# Patient Record
Sex: FEMALE | Race: WHITE | Employment: OTHER | URBAN - METROPOLITAN AREA
[De-identification: names, ages, dates, MRNs, and addresses within clinical notes are randomized per-mention and may not be internally consistent; named-entity substitution may affect disease eponyms.]

---

## 2023-09-17 RX ORDER — CHLORDIAZEPOXIDE HYDROCHLORIDE 10 MG/1
10 CAPSULE, GELATIN COATED ORAL 3 TIMES DAILY PRN
COMMUNITY
End: 2023-11-10 | Stop reason: SDUPTHER

## 2023-09-17 RX ORDER — ESTRADIOL 1 MG/1
1 TABLET ORAL DAILY
COMMUNITY

## 2023-09-17 RX ORDER — MELOXICAM 7.5 MG/1
7.5 TABLET ORAL DAILY
COMMUNITY
End: 2023-11-15 | Stop reason: ALTCHOICE

## 2023-09-17 RX ORDER — RIZATRIPTAN BENZOATE 10 MG/1
10 TABLET ORAL ONCE AS NEEDED
COMMUNITY

## 2023-09-17 RX ORDER — ASCORBIC ACID 250 MG
250 TABLET,CHEWABLE ORAL
COMMUNITY

## 2023-09-17 RX ORDER — AMITRIPTYLINE HYDROCHLORIDE 25 MG/1
TABLET, FILM COATED ORAL NIGHTLY
COMMUNITY
End: 2023-11-10 | Stop reason: SDUPTHER

## 2023-09-17 RX ORDER — PROGESTERONE 100 MG/1
100 CAPSULE ORAL DAILY
COMMUNITY

## 2023-09-17 RX ORDER — ADHESIVE BANDAGE
BANDAGE TOPICAL DAILY PRN
COMMUNITY
End: 2023-11-15 | Stop reason: ALTCHOICE

## 2023-09-17 RX ORDER — OMEPRAZOLE 20 MG/1
20 TABLET, DELAYED RELEASE ORAL
COMMUNITY

## 2023-09-17 RX ORDER — DIAZEPAM 5 MG/1
5 TABLET ORAL EVERY 8 HOURS PRN
COMMUNITY
End: 2023-12-15 | Stop reason: ALTCHOICE

## 2023-09-17 RX ORDER — LANOLIN ALCOHOL/MO/W.PET/CERES
100 CREAM (GRAM) TOPICAL DAILY
COMMUNITY

## 2023-09-17 RX ORDER — UBIDECARENONE 30 MG
30 CAPSULE ORAL DAILY
COMMUNITY

## 2023-09-17 RX ORDER — TRETINOIN 1 MG/G
CREAM TOPICAL NIGHTLY
COMMUNITY

## 2023-11-10 ENCOUNTER — OFFICE VISIT (OUTPATIENT)
Dept: PRIMARY CARE | Facility: CLINIC | Age: 73
End: 2023-11-10
Payer: MEDICARE

## 2023-11-10 ENCOUNTER — ANCILLARY PROCEDURE (OUTPATIENT)
Dept: RADIOLOGY | Facility: CLINIC | Age: 73
End: 2023-11-10
Payer: MEDICARE

## 2023-11-10 VITALS
DIASTOLIC BLOOD PRESSURE: 83 MMHG | BODY MASS INDEX: 27.83 KG/M2 | OXYGEN SATURATION: 96 % | HEART RATE: 98 BPM | WEIGHT: 173.2 LBS | TEMPERATURE: 97.5 F | HEIGHT: 66 IN | SYSTOLIC BLOOD PRESSURE: 113 MMHG

## 2023-11-10 DIAGNOSIS — Z00.00 WELL ADULT EXAM: ICD-10-CM

## 2023-11-10 DIAGNOSIS — T78.40XA ALLERGY, INITIAL ENCOUNTER: ICD-10-CM

## 2023-11-10 DIAGNOSIS — M79.671 RIGHT FOOT PAIN: Primary | ICD-10-CM

## 2023-11-10 DIAGNOSIS — M25.562 CHRONIC PAIN OF LEFT KNEE: ICD-10-CM

## 2023-11-10 DIAGNOSIS — L82.1 SEBORRHEIC KERATOSES: ICD-10-CM

## 2023-11-10 DIAGNOSIS — M79.672 PAIN IN BOTH FEET: ICD-10-CM

## 2023-11-10 DIAGNOSIS — M25.562 ACUTE PAIN OF LEFT KNEE: ICD-10-CM

## 2023-11-10 DIAGNOSIS — J30.5 ACUTE ALLERGIC RHINITIS DUE TO FOOD: ICD-10-CM

## 2023-11-10 DIAGNOSIS — G89.29 CHRONIC PAIN OF LEFT KNEE: ICD-10-CM

## 2023-11-10 DIAGNOSIS — R91.1 LUNG NODULE: ICD-10-CM

## 2023-11-10 DIAGNOSIS — R53.83 OTHER FATIGUE: ICD-10-CM

## 2023-11-10 DIAGNOSIS — M79.671 PAIN IN BOTH FEET: ICD-10-CM

## 2023-11-10 DIAGNOSIS — Z00.00 WELL ADULT EXAM: Primary | ICD-10-CM

## 2023-11-10 DIAGNOSIS — R79.89 LOW VITAMIN D LEVEL: ICD-10-CM

## 2023-11-10 DIAGNOSIS — F41.9 ANXIETY: ICD-10-CM

## 2023-11-10 DIAGNOSIS — E78.2 HYPERLIPEMIA, MIXED: ICD-10-CM

## 2023-11-10 DIAGNOSIS — E55.9 VITAMIN D DEFICIENCY: ICD-10-CM

## 2023-11-10 PROCEDURE — 73721 MRI JNT OF LWR EXTRE W/O DYE: CPT | Mod: LT

## 2023-11-10 PROCEDURE — 1159F MED LIST DOCD IN RCRD: CPT | Performed by: INTERNAL MEDICINE

## 2023-11-10 PROCEDURE — G0439 PPPS, SUBSEQ VISIT: HCPCS | Performed by: INTERNAL MEDICINE

## 2023-11-10 PROCEDURE — 99214 OFFICE O/P EST MOD 30 MIN: CPT | Performed by: INTERNAL MEDICINE

## 2023-11-10 PROCEDURE — 71046 X-RAY EXAM CHEST 2 VIEWS: CPT | Performed by: STUDENT IN AN ORGANIZED HEALTH CARE EDUCATION/TRAINING PROGRAM

## 2023-11-10 PROCEDURE — 73721 MRI JNT OF LWR EXTRE W/O DYE: CPT | Mod: LEFT SIDE | Performed by: RADIOLOGY

## 2023-11-10 PROCEDURE — 71046 X-RAY EXAM CHEST 2 VIEWS: CPT

## 2023-11-10 PROCEDURE — 73562 X-RAY EXAM OF KNEE 3: CPT | Mod: LEFT SIDE | Performed by: STUDENT IN AN ORGANIZED HEALTH CARE EDUCATION/TRAINING PROGRAM

## 2023-11-10 PROCEDURE — 73630 X-RAY EXAM OF FOOT: CPT | Mod: RT,FY

## 2023-11-10 PROCEDURE — 73562 X-RAY EXAM OF KNEE 3: CPT | Mod: LT,FY

## 2023-11-10 PROCEDURE — 1160F RVW MEDS BY RX/DR IN RCRD: CPT | Performed by: INTERNAL MEDICINE

## 2023-11-10 PROCEDURE — G0446 INTENS BEHAVE THER CARDIO DX: HCPCS | Performed by: INTERNAL MEDICINE

## 2023-11-10 PROCEDURE — 1036F TOBACCO NON-USER: CPT | Performed by: INTERNAL MEDICINE

## 2023-11-10 PROCEDURE — 73630 X-RAY EXAM OF FOOT: CPT | Mod: RIGHT SIDE | Performed by: STUDENT IN AN ORGANIZED HEALTH CARE EDUCATION/TRAINING PROGRAM

## 2023-11-10 PROCEDURE — G0444 DEPRESSION SCREEN ANNUAL: HCPCS | Performed by: INTERNAL MEDICINE

## 2023-11-10 PROCEDURE — 93000 ELECTROCARDIOGRAM COMPLETE: CPT | Performed by: INTERNAL MEDICINE

## 2023-11-10 RX ORDER — AMITRIPTYLINE HYDROCHLORIDE 25 MG/1
25 TABLET, FILM COATED ORAL NIGHTLY
Qty: 90 TABLET | Refills: 3 | Status: SHIPPED | OUTPATIENT
Start: 2023-11-10

## 2023-11-10 RX ORDER — CHLORDIAZEPOXIDE HYDROCHLORIDE 10 MG/1
10 CAPSULE, GELATIN COATED ORAL 3 TIMES DAILY PRN
Qty: 90 CAPSULE | Refills: 1 | Status: SHIPPED | OUTPATIENT
Start: 2023-11-10

## 2023-11-10 ASSESSMENT — PATIENT HEALTH QUESTIONNAIRE - PHQ9
1. LITTLE INTEREST OR PLEASURE IN DOING THINGS: NOT AT ALL
2. FEELING DOWN, DEPRESSED OR HOPELESS: NOT AT ALL
SUM OF ALL RESPONSES TO PHQ9 QUESTIONS 1 AND 2: 0

## 2023-11-10 NOTE — PROGRESS NOTES
Chief Complaint:   Medicare Wellness Exam/Comprehensive Problem Focused Follow Up and Physical Exam    HPI:  Left knee pain  Was taking care of friend  Now having   Prolapse  [facility]  For [facility] issus  Knee pain one year  ? Injury if   Was taking care of friend  Patient Care Team:  Frieda Holliday MD as PCP - General   Active Problem List  There is no problem list on file for this patient.        Comprehensive Medical/Surgical/Social/Family History  Past Medical History:   Diagnosis Date    Abdominal swelling     Encounter for other screening for malignant neoplasm of breast 09/16/2019    Breast cancer screening    Endometriosis     Intra-abdominal and pelvic swelling, mass and lump, unspecified site 05/14/2015    Abdominal swelling    Other specified diseases of intestine 01/22/2015    Intestinal bacterial overgrowth    Personal history of other diseases of the female genital tract     History of endometriosis    Personal history of other infectious and parasitic diseases 01/12/2015    History of Helicobacter pylori infection    Personal history of other medical treatment     H/O chest x-ray    Personal history of other specified conditions 08/27/2018    History of abdominal pain     Past Surgical History:   Procedure Laterality Date    ABDOMINAL ADHESION SURGERY  02/09/2015    Laparoscopic Lysis Of Intestinal Adhesions    APPENDECTOMY  01/12/2015    Appendectomy    OTHER SURGICAL HISTORY  01/12/2015    Ovarian Cystectomy    OTHER SURGICAL HISTORY  01/12/2015    Hemilaminectomy Decompressive With Partial Facetectomy    OTHER SURGICAL HISTORY  09/16/2019    Colonoscopy dr irvin    TONSILLECTJUANITA  01/12/2015    Tonsillectomy     Social History     Social History Narrative    Not on file     Tobacco/Alcohol/Opioid use, as well as Illicit Drug Use was screened for/reviewed and documented in Social Documentation section of the chart and medication list as appropriate    Allergies and  "Medications  Ciprofloxacin, Codeine sulfate, and Iodinated contrast media  Current Outpatient Medications   Medication Instructions    amitriptyline (ELAVIL) 25 mg, oral, Nightly    ascorbic acid (VITAMIN C) 250 mg, oral    chlordiazePOXIDE (LIBRIUM) 10 mg, oral, 3 times daily PRN    co-enzyme Q-10 30 mg, oral, Daily    cyanocobalamin (VITAMIN B-12) 100 mcg, oral, Daily    diazePAM (VALIUM) 5 mg, oral, Every 8 hours PRN    estradiol (ESTRACE) 1 mg, oral, Daily    magnesium hydroxide (Milk of Magnesia) 400 mg/5 mL suspension oral, Daily PRN    meloxicam (MOBIC) 7.5 mg, oral, Daily    omeprazole OTC (PRILOSEC OTC) 20 mg, oral, Daily before breakfast, Do not crush, chew, or split.    progesterone (PROMETRIUM) 100 mg, oral, Daily    rizatriptan (MAXALT) 10 mg, oral, Once as needed, May repeat in 2 hours if unresolved. Do not exceed 30 mg in 24 hours.    tretinoin (Retin-A) 0.1 % cream Topical, Nightly    turmeric, bulk, 95 % powder miscellaneous     Medications and Supplements  prescribed by me and other practitioners or clinical pharmacist (such as prescriptions, OTC's, herbal therapies and supplements) were reviewed and documented in the medical record.      Activities of Daily Living  In your present state of health, do you have any difficulty performing the following activities?:   Preparing food and eating?: No  Bathing yourself: No  Getting dressed: No  Using the toilet:No  Moving around from place to place: No  In the past year have you fallen or had a near fall?:No  Able to manage finances independently: Yes  Able to perform grocery shopping: Yes  Able to manage medications independently: Yes  Able to do housework independently: Yes  Patient self-assessment of health status? Good    Depression Screen  (Note: if answer to either of the following is \"Yes\", then a more complete depression screening is indicated)   Q1: Over the past two weeks, have you felt down, depressed or hopeless? no  Q2: Over the past two " weeks, have you felt little interest or pleasure in doing things? No  Sad over friends passing   Current exercise habits: not currently    Dietary issues discussed: Yes  Hearing difficulties: No  Safe in current home environment: Yes  Visual Acuity assessed: No  Cognitive Impairment No    Advance directives  Advanced Care Planning (including a Living Will, Healthcare POA, as well as specific end of life choices and/or directives), was discussed with the patient and/or surrogate, voluntarily, and documented in the Problem List of the medical record.    is poa   Cardiac Risk Assessment  Cardiovascular risk was discussed and, if needed, lifestyle modifications recommended, including nutritional choices, exercise, and elimination of habits contributing to risk. We agreed on a plan to reduce the current cardiovascular risk based on above discussion as needed.  Aspirin use/disuse was discussed and documented in the Problem List of the medical record after reviewing the updated guidelines below:    Consider low dose Aspirin ( mg) use if the benefit for cardiovascular disease prevention outweighs risk for bleeding complications.   In general, low dose ASA should be considered:  In patients WITHOUT prior MI/stroke/PAD (primary prevention):   a. Age <60: Use if 10-year cardiovascular disease risk >20%, with discussion of risks and benefits with patient  b. Age 60-<70: Use if 10-year cardiovascular disease risk >20% and low bleeding (e.g., gastrointenstinal) risk, with discussion of risks and benefits with patient  c. Age >=70: Do not use    In patients WITH prior MI/stroke/PAD (secondary prevention):   Generally use unless extremely high bleeding (e.g., gastrointenstinal) risk, with discussion of risks and benefits with patient        ROS otherwise negative aside from what was mentioned above in HPI.    Gen:  no fever  HEENT:  no trouble swallowing  CV:  no dyspnea, cyanosis  Lungs:  no shortness of breath  GI:   "no constipation, no blood in stool  Vascular:  no edema  Neuro:   no weakness  Skin:  no rash  MS:no joint swelling  Gu:  no urinary complaints  All other systems have been reviewed and are negative for complaint    Vitals  /83   Pulse 98   Temp 36.4 °C (97.5 °F) (Temporal)   Ht 1.676 m (5' 6\")   Wt 78.6 kg (173 lb 3.2 oz)   SpO2 96%   BMI 27.96 kg/m²   Body mass index is 27.96 kg/m².  Objective   Physical Exam  General Appearance:  Alert and oriented.  NAD  HEENT:  Tm's normal , throat clear, no erythema  Lungs, CTAB  Skin:  no suspicious lesions,  warm and dry  Head :  Normocephalic  Oral Cavity; Clear mucosa moist  Neck/thyroid:  neck supple, full rom, no cervical lymphadenopathy  no thyromegaly  Heart:  RRR  no murmurs  Abdomen:  Normal , bs present, soft, nontender, not distended, no masses palpated  Extremities:  No clubbing, cyanosis, or edema  Neurologic:  Nonfocal  Psych: alert, normal mood  Tender ball of r foot     During the course of the visit the patient was educated and counseled about age appropriate screening and preventive services. Completed preventive screenings were documented in the chart and orders were placed for outstanding screenings/procedures as documented in the Assessment and Plan.    Patient Instructions (the written plan) was given to the patient at check out.    Terri was seen today for primary care established.  Diagnoses and all orders for this visit:  Well adult exam (Primary)  -     chlordiazePOXIDE (Librium) 10 mg capsule; Take 1 capsule (10 mg) by mouth 3 times a day as needed for anxiety.  -     amitriptyline (Elavil) 25 mg tablet; Take 1 tablet (25 mg) by mouth once daily at bedtime.  -     Hemoglobin A1C; Future  -     Comprehensive Metabolic Panel; Future  -     TSH with reflex to Free T4 if abnormal; Future  -     Vitamin D 25-Hydroxy,Total (for eval of Vitamin D levels); Future  -     Vitamin B12; Future  -     Lipid Panel; Future  -     CBC and Auto " Differential; Future  -     ECG 12 Lead  -     Food Allergy Profile IgE; Future  -     Respiratory Allergy Profile IgE; Future  -     XR foot right 3+ views; Future  -     XR knee left 3 views; Future  Anxiety  -     chlordiazePOXIDE (Librium) 10 mg capsule; Take 1 capsule (10 mg) by mouth 3 times a day as needed for anxiety.  -     amitriptyline (Elavil) 25 mg tablet; Take 1 tablet (25 mg) by mouth once daily at bedtime.  -     Hemoglobin A1C; Future  -     Comprehensive Metabolic Panel; Future  -     TSH with reflex to Free T4 if abnormal; Future  -     Vitamin D 25-Hydroxy,Total (for eval of Vitamin D levels); Future  -     Vitamin B12; Future  -     Lipid Panel; Future  -     CBC and Auto Differential; Future  -     ECG 12 Lead  -     Food Allergy Profile IgE; Future  -     Respiratory Allergy Profile IgE; Future  -     XR foot right 3+ views; Future  -     XR knee left 3 views; Future  Pain in both feet  -     chlordiazePOXIDE (Librium) 10 mg capsule; Take 1 capsule (10 mg) by mouth 3 times a day as needed for anxiety.  -     amitriptyline (Elavil) 25 mg tablet; Take 1 tablet (25 mg) by mouth once daily at bedtime.  -     Hemoglobin A1C; Future  -     Comprehensive Metabolic Panel; Future  -     TSH with reflex to Free T4 if abnormal; Future  -     Vitamin D 25-Hydroxy,Total (for eval of Vitamin D levels); Future  -     Vitamin B12; Future  -     Lipid Panel; Future  -     CBC and Auto Differential; Future  -     ECG 12 Lead  -     Food Allergy Profile IgE; Future  -     Respiratory Allergy Profile IgE; Future  -     XR foot right 3+ views; Future  -     XR knee left 3 views; Future  -     Referral to Podiatry; Future  Acute pain of left knee  -     chlordiazePOXIDE (Librium) 10 mg capsule; Take 1 capsule (10 mg) by mouth 3 times a day as needed for anxiety.  -     amitriptyline (Elavil) 25 mg tablet; Take 1 tablet (25 mg) by mouth once daily at bedtime.  -     Hemoglobin A1C; Future  -     Comprehensive  Metabolic Panel; Future  -     TSH with reflex to Free T4 if abnormal; Future  -     Vitamin D 25-Hydroxy,Total (for eval of Vitamin D levels); Future  -     Vitamin B12; Future  -     Lipid Panel; Future  -     CBC and Auto Differential; Future  -     ECG 12 Lead  -     Food Allergy Profile IgE; Future  -     Respiratory Allergy Profile IgE; Future  -     XR foot right 3+ views; Future  -     XR knee left 3 views; Future  Seborrheic keratoses  -     chlordiazePOXIDE (Librium) 10 mg capsule; Take 1 capsule (10 mg) by mouth 3 times a day as needed for anxiety.  -     amitriptyline (Elavil) 25 mg tablet; Take 1 tablet (25 mg) by mouth once daily at bedtime.  -     Hemoglobin A1C; Future  -     Comprehensive Metabolic Panel; Future  -     TSH with reflex to Free T4 if abnormal; Future  -     Vitamin D 25-Hydroxy,Total (for eval of Vitamin D levels); Future  -     Vitamin B12; Future  -     Lipid Panel; Future  -     CBC and Auto Differential; Future  -     ECG 12 Lead  -     Food Allergy Profile IgE; Future  -     Respiratory Allergy Profile IgE; Future  -     XR foot right 3+ views; Future  -     Referral to Dermatology  -     XR knee left 3 views; Future  Allergy, initial encounter  -     Food Allergy Profile IgE; Future  Acute allergic rhinitis due to food  -     Respiratory Allergy Profile IgE; Future  Low vitamin D level  -     Hemoglobin A1C; Future  Vitamin D deficiency  -     Vitamin D 25-Hydroxy,Total (for eval of Vitamin D levels); Future  Hyperlipemia, mixed  -     Lipid Panel; Future  Lung nodule  -     XR chest 2 views; Future  Chronic pain of left knee  -     Cancel: XR knee left 3 views; Future  -     MR knee left wo IV contrast; Future  Other fatigue      Chronic conditions reviewed in the assessment and plan.    Continue medications unless specified otherwise.  Previous labs reviewed.   Other specialty provider notes reviewed.       Annual Wellness exam completed   Preventive Health history  reviewed:  Vaccines today: none  Labs ordered    Depression Screening done  Advanced Directives Discussion Completed  Cardiovascular risk discussed and if needed, lifestyle modifications recommended, including nutritional choices, exercise, and elimination of habits contributing to risk.  We agreed on a plan to reduce the current cardiovascular risk.  See ecalc ASCVD Risk  Plus for data discussed regarding risk and risk reduction opportunities.  Aspirin use/disuse was discussed after reviewing the updated guidelines.    Follow up in one month or prn   Pt would pay for mri  Would like stem cell or prp for knee if needed

## 2023-11-15 ENCOUNTER — OFFICE VISIT (OUTPATIENT)
Dept: OBSTETRICS AND GYNECOLOGY | Facility: CLINIC | Age: 73
End: 2023-11-15
Payer: MEDICARE

## 2023-11-15 ENCOUNTER — LAB (OUTPATIENT)
Dept: LAB | Facility: LAB | Age: 73
End: 2023-11-15
Payer: MEDICARE

## 2023-11-15 VITALS — BODY MASS INDEX: 27.28 KG/M2 | DIASTOLIC BLOOD PRESSURE: 60 MMHG | WEIGHT: 169 LBS | SYSTOLIC BLOOD PRESSURE: 110 MMHG

## 2023-11-15 DIAGNOSIS — R79.89 LOW VITAMIN D LEVEL: ICD-10-CM

## 2023-11-15 DIAGNOSIS — Z00.00 WELL ADULT EXAM: ICD-10-CM

## 2023-11-15 DIAGNOSIS — T78.40XA ALLERGY, INITIAL ENCOUNTER: ICD-10-CM

## 2023-11-15 DIAGNOSIS — L82.1 SEBORRHEIC KERATOSES: ICD-10-CM

## 2023-11-15 DIAGNOSIS — E55.9 VITAMIN D DEFICIENCY: ICD-10-CM

## 2023-11-15 DIAGNOSIS — E78.2 HYPERLIPEMIA, MIXED: ICD-10-CM

## 2023-11-15 DIAGNOSIS — M25.562 ACUTE PAIN OF LEFT KNEE: ICD-10-CM

## 2023-11-15 DIAGNOSIS — J30.5 ACUTE ALLERGIC RHINITIS DUE TO FOOD: ICD-10-CM

## 2023-11-15 DIAGNOSIS — N32.81 OAB (OVERACTIVE BLADDER): Primary | ICD-10-CM

## 2023-11-15 DIAGNOSIS — M79.671 PAIN IN BOTH FEET: ICD-10-CM

## 2023-11-15 DIAGNOSIS — M79.672 PAIN IN BOTH FEET: ICD-10-CM

## 2023-11-15 DIAGNOSIS — F41.9 ANXIETY: ICD-10-CM

## 2023-11-15 LAB
25(OH)D3 SERPL-MCNC: 60 NG/ML (ref 30–100)
ALBUMIN SERPL BCP-MCNC: 4.1 G/DL (ref 3.4–5)
ALP SERPL-CCNC: 80 U/L (ref 33–136)
ALT SERPL W P-5'-P-CCNC: 17 U/L (ref 7–45)
ANION GAP SERPL CALC-SCNC: 14 MMOL/L (ref 10–20)
AST SERPL W P-5'-P-CCNC: 20 U/L (ref 9–39)
BASOPHILS # BLD AUTO: 0.04 X10*3/UL (ref 0–0.1)
BASOPHILS NFR BLD AUTO: 0.5 %
BILIRUB SERPL-MCNC: 0.6 MG/DL (ref 0–1.2)
BUN SERPL-MCNC: 11 MG/DL (ref 6–23)
CALCIUM SERPL-MCNC: 9.2 MG/DL (ref 8.6–10.3)
CHLORIDE SERPL-SCNC: 107 MMOL/L (ref 98–107)
CHOLEST SERPL-MCNC: 272 MG/DL (ref 0–199)
CHOLESTEROL/HDL RATIO: 3.7
CO2 SERPL-SCNC: 23 MMOL/L (ref 21–32)
CREAT SERPL-MCNC: 0.93 MG/DL (ref 0.5–1.05)
EOSINOPHIL # BLD AUTO: 0.02 X10*3/UL (ref 0–0.4)
EOSINOPHIL NFR BLD AUTO: 0.2 %
ERYTHROCYTE [DISTWIDTH] IN BLOOD BY AUTOMATED COUNT: 12.7 % (ref 11.5–14.5)
EST. AVERAGE GLUCOSE BLD GHB EST-MCNC: 111 MG/DL
GFR SERPL CREATININE-BSD FRML MDRD: 66 ML/MIN/1.73M*2
GLUCOSE SERPL-MCNC: 104 MG/DL (ref 74–99)
HBA1C MFR BLD: 5.5 %
HCT VFR BLD AUTO: 45.3 % (ref 36–46)
HDLC SERPL-MCNC: 74.4 MG/DL
HGB BLD-MCNC: 14.7 G/DL (ref 12–16)
IMM GRANULOCYTES # BLD AUTO: 0.02 X10*3/UL (ref 0–0.5)
IMM GRANULOCYTES NFR BLD AUTO: 0.2 % (ref 0–0.9)
LDLC SERPL CALC-MCNC: 169 MG/DL
LYMPHOCYTES # BLD AUTO: 1.94 X10*3/UL (ref 0.8–3)
LYMPHOCYTES NFR BLD AUTO: 24.2 %
MCH RBC QN AUTO: 30.1 PG (ref 26–34)
MCHC RBC AUTO-ENTMCNC: 32.5 G/DL (ref 32–36)
MCV RBC AUTO: 93 FL (ref 80–100)
MONOCYTES # BLD AUTO: 0.55 X10*3/UL (ref 0.05–0.8)
MONOCYTES NFR BLD AUTO: 6.9 %
NEUTROPHILS # BLD AUTO: 5.45 X10*3/UL (ref 1.6–5.5)
NEUTROPHILS NFR BLD AUTO: 68 %
NON HDL CHOLESTEROL: 198 MG/DL (ref 0–149)
NRBC BLD-RTO: 0 /100 WBCS (ref 0–0)
PLATELET # BLD AUTO: 305 X10*3/UL (ref 150–450)
POTASSIUM SERPL-SCNC: 4.2 MMOL/L (ref 3.5–5.3)
PROT SERPL-MCNC: 6.9 G/DL (ref 6.4–8.2)
RBC # BLD AUTO: 4.89 X10*6/UL (ref 4–5.2)
SODIUM SERPL-SCNC: 140 MMOL/L (ref 136–145)
TRIGL SERPL-MCNC: 142 MG/DL (ref 0–149)
TSH SERPL-ACNC: 1.04 MIU/L (ref 0.44–3.98)
VIT B12 SERPL-MCNC: 758 PG/ML (ref 211–911)
VLDL: 28 MG/DL (ref 0–40)
WBC # BLD AUTO: 8 X10*3/UL (ref 4.4–11.3)

## 2023-11-15 PROCEDURE — 1159F MED LIST DOCD IN RCRD: CPT | Performed by: OBSTETRICS & GYNECOLOGY

## 2023-11-15 PROCEDURE — 80053 COMPREHEN METABOLIC PANEL: CPT

## 2023-11-15 PROCEDURE — 1036F TOBACCO NON-USER: CPT | Performed by: OBSTETRICS & GYNECOLOGY

## 2023-11-15 PROCEDURE — 36415 COLL VENOUS BLD VENIPUNCTURE: CPT

## 2023-11-15 PROCEDURE — 82607 VITAMIN B-12: CPT

## 2023-11-15 PROCEDURE — 82306 VITAMIN D 25 HYDROXY: CPT

## 2023-11-15 PROCEDURE — 80061 LIPID PANEL: CPT

## 2023-11-15 PROCEDURE — 99205 OFFICE O/P NEW HI 60 MIN: CPT | Performed by: OBSTETRICS & GYNECOLOGY

## 2023-11-15 PROCEDURE — 1160F RVW MEDS BY RX/DR IN RCRD: CPT | Performed by: OBSTETRICS & GYNECOLOGY

## 2023-11-15 PROCEDURE — 84443 ASSAY THYROID STIM HORMONE: CPT

## 2023-11-15 PROCEDURE — 83036 HEMOGLOBIN GLYCOSYLATED A1C: CPT

## 2023-11-15 PROCEDURE — 82785 ASSAY OF IGE: CPT

## 2023-11-15 PROCEDURE — 85025 COMPLETE CBC W/AUTO DIFF WBC: CPT

## 2023-11-15 PROCEDURE — 86003 ALLG SPEC IGE CRUDE XTRC EA: CPT

## 2023-11-15 RX ORDER — WHEAT DEXTRIN 3 G/3.8 G
POWDER (GRAM) ORAL
Qty: 235 G | Refills: 3 | Status: SHIPPED | OUTPATIENT
Start: 2023-11-15 | End: 2023-12-15 | Stop reason: ALTCHOICE

## 2023-11-15 RX ORDER — FLUTICASONE PROPIONATE 50 MCG
1 SPRAY, SUSPENSION (ML) NASAL DAILY
COMMUNITY
Start: 2021-02-19 | End: 2023-12-15 | Stop reason: ALTCHOICE

## 2023-11-15 RX ORDER — MIRABEGRON 25 MG/1
25 TABLET, FILM COATED, EXTENDED RELEASE ORAL DAILY
Qty: 90 TABLET | Refills: 2 | Status: SHIPPED | OUTPATIENT
Start: 2023-11-15

## 2023-11-15 NOTE — PROGRESS NOTES
"Subjective   Patient ID: Terri Fernandez \"Shannan\" is a 73 y.o. female who presents with bulge from vaginal area.     HPI    Upon evaluation patient reports noticing \"small\" bulge that comes out of her vagina. She states the bulge, along with increased urinary urgency has caused her to stop engaging in the activities she once enjoyed such as cycling with her . She reports at times to manage the bulge she has used tampons. She requests a  breast exam be performed today as she has not had \"one in a while.\"     In regards to bowel movements she has been experiencing constipation and has been supplementing with activia and prune juice in order to establish normal bowel habits. She expresses concern regarding safety of sexual intercourse with her  due to the bulge causing concern.     Objective     Vitals:    11/15/23 1128   BP: 110/60       Past Medical History:   Diagnosis Date    Abdominal swelling     Encounter for other screening for malignant neoplasm of breast 09/16/2019    Breast cancer screening    Endometriosis     Intra-abdominal and pelvic swelling, mass and lump, unspecified site 05/14/2015    Abdominal swelling    Other specified diseases of intestine 01/22/2015    Intestinal bacterial overgrowth    Personal history of other diseases of the female genital tract     History of endometriosis    Personal history of other infectious and parasitic diseases 01/12/2015    History of Helicobacter pylori infection    Personal history of other medical treatment     H/O chest x-ray    Personal history of other specified conditions 08/27/2018    History of abdominal pain      Past Surgical History:   Procedure Laterality Date    ABDOMINAL ADHESION SURGERY  02/09/2015    Laparoscopic Lysis Of Intestinal Adhesions    APPENDECTOMY  01/12/2015    Appendectomy    OTHER SURGICAL HISTORY  01/12/2015    Ovarian Cystectomy    OTHER SURGICAL HISTORY  01/12/2015    Hemilaminectomy Decompressive With Partial " Facetectomy    OTHER SURGICAL HISTORY  09/16/2019    Colonoscopy dr irvin    TONSILLECTOMY  01/12/2015    Tonsillectomy      Review of Systems   Constitutional:  Positive for activity change.   Gastrointestinal:  Positive for constipation.   Psychiatric/Behavioral:          Patient is tearful and anxious     Physical examination:  General: No distress  Neck: No masses  Respiratory: No respiratory distress  Breasts: No masses or lesions lymphatic chains bilateral and adnexa without lymphadenopathy. Patient has bilateral fibrocystic changes of breast.  Abdomen: soft nontender no hernias  GYN: Normal vulvar skin normal clitoral arciniega and clitoris labia majora and minora normal pink vaginal mucosa no lesions cervix normal uterine body not enlarged no enlargement or pain and unilateral adnexa .   Popq:    +1+1-6// 2.5 6 8 //-3-3-7  perianal area: without lesions    Assessment/Plan     73-year-old isolated stage II anterior prolapse overactive bladder constipation      Could consider anterior repair    Constipation    -     wheat dextrin (Benefiber Sugar Free, dextrin,) 3 gram/3.8 gram powder; 1 daily Scoop in liquid, may taper up    OAB (overactive bladder)  -     mirabegron (Mybetriq) 25 mg tablet extended release 24 hr 24 hr tablet; Take 1 tablet (25 mg) by mouth once daily.    Treatment options for pelvic organ prolapse reviewed, pelvic PT (not likely to be of significant benefit), pessary, and surgical management. Patient elects for symptomatic management of urinary urgency and constipation at this time and follow up in 6 months to discuss symptom improvement and possibility of surgical management if indicated.

## 2023-11-16 LAB
A ALTERNATA IGE QN: <0.1 KU/L
A FUMIGATUS IGE QN: <0.1 KU/L
BERMUDA GRASS IGE QN: <0.1 KU/L
BOXELDER IGE QN: <0.1 KU/L
C HERBARUM IGE QN: <0.1 KU/L
CALIF WALNUT POLN IGE QN: <0.1 KU/L
CAT DANDER IGE QN: <0.1 KU/L
CLAM IGE QN: 0.48 KU/L
CMN PIGWEED IGE QN: <0.1 KU/L
CODFISH IGE QN: <0.1 KU/L
COMMON RAGWEED IGE QN: <0.1 KU/L
CORN IGE QN: <0.1
COTTONWOOD IGE QN: <0.1 KU/L
D FARINAE IGE QN: <0.1 KU/L
D PTERONYSS IGE QN: <0.1 KU/L
DOG DANDER IGE QN: <0.1 KU/L
EGG WHITE IGE QN: 0.19 KU/L
ENGL PLANTAIN IGE QN: <0.1 KU/L
GOOSEFOOT IGE QN: <0.1 KU/L
JOHNSON GRASS IGE QN: <0.1 KU/L
KENT BLUE GRASS IGE QN: <0.1 KU/L
LONDON PLANE IGE QN: <0.1 KU/L
MILK IGE QN: 0.18 KU/L
MT JUNIPER IGE QN: <0.1 KU/L
P NOTATUM IGE QN: <0.1 KU/L
PEANUT IGE QN: <0.1 KU/L
PECAN/HICK TREE IGE QN: <0.1 KU/L
ROACH IGE QN: 0.88 KU/L
SALTWORT IGE QN: <0.1 KU/L
SCALLOP IGE QN: 0.62 KU/L
SESAME SEED IGE QN: <0.1 KU/L
SHEEP SORREL IGE QN: <0.1 KU/L
SHRIMP IGE QN: 0.12 KU/L
SILVER BIRCH IGE QN: <0.1 KU/L
SOYBEAN IGE QN: <0.1 KU/L
TIMOTHY IGE QN: <0.1 KU/L
TOTAL IGE SMQN RAST: 50.4 KU/L
WALNUT IGE QN: <0.1 KU/L
WHEAT IGE QN: <0.1 KU/L
WHITE ASH IGE QN: <0.1 KU/L
WHITE ELM IGE QN: <0.1 KU/L
WHITE MULBERRY IGE QN: <0.1 KU/L
WHITE OAK IGE QN: <0.1 KU/L

## 2023-11-20 DIAGNOSIS — Z91.018 FOOD ALLERGY: Primary | ICD-10-CM

## 2023-11-29 ENCOUNTER — ANCILLARY PROCEDURE (OUTPATIENT)
Dept: RADIOLOGY | Facility: CLINIC | Age: 73
End: 2023-11-29
Payer: MEDICARE

## 2023-11-29 DIAGNOSIS — M79.671 RIGHT FOOT PAIN: ICD-10-CM

## 2023-11-29 PROCEDURE — 73718 MRI LOWER EXTREMITY W/O DYE: CPT | Mod: RT

## 2023-11-29 PROCEDURE — 73718 MRI LOWER EXTREMITY W/O DYE: CPT | Mod: RIGHT SIDE | Performed by: RADIOLOGY

## 2023-12-03 ENCOUNTER — TELEPHONE (OUTPATIENT)
Dept: PRIMARY CARE | Facility: CLINIC | Age: 73
End: 2023-12-03
Payer: MEDICARE

## 2023-12-03 DIAGNOSIS — U07.1 COVID: Primary | ICD-10-CM

## 2023-12-03 RX ORDER — NIRMATRELVIR AND RITONAVIR 300-100 MG
3 KIT ORAL 2 TIMES DAILY
Qty: 30 TABLET | Refills: 0 | Status: SHIPPED | OUTPATIENT
Start: 2023-12-03 | End: 2023-12-03 | Stop reason: SDUPTHER

## 2023-12-03 RX ORDER — ONDANSETRON 4 MG/1
8 TABLET, FILM COATED ORAL EVERY 12 HOURS PRN
Qty: 14 TABLET | Refills: 0 | Status: SHIPPED | OUTPATIENT
Start: 2023-12-03 | End: 2023-12-15 | Stop reason: ALTCHOICE

## 2023-12-03 RX ORDER — ONDANSETRON 4 MG/1
8 TABLET, FILM COATED ORAL EVERY 12 HOURS PRN
Qty: 14 TABLET | Refills: 0 | Status: SHIPPED | OUTPATIENT
Start: 2023-12-03 | End: 2023-12-03 | Stop reason: SDUPTHER

## 2023-12-03 RX ORDER — NIRMATRELVIR AND RITONAVIR 300-100 MG
3 KIT ORAL 2 TIMES DAILY
Qty: 30 TABLET | Refills: 0 | Status: SHIPPED | OUTPATIENT
Start: 2023-12-03 | End: 2023-12-15 | Stop reason: ALTCHOICE

## 2023-12-04 ENCOUNTER — TELEPHONE (OUTPATIENT)
Dept: PRIMARY CARE | Facility: CLINIC | Age: 73
End: 2023-12-04
Payer: MEDICARE

## 2023-12-04 DIAGNOSIS — U07.1 COVID: ICD-10-CM

## 2023-12-04 DIAGNOSIS — M79.672 LEFT FOOT PAIN: ICD-10-CM

## 2023-12-04 DIAGNOSIS — R93.89 ABNORMAL MRI: ICD-10-CM

## 2023-12-04 DIAGNOSIS — J98.01 BRONCHOSPASM: Primary | ICD-10-CM

## 2023-12-04 PROBLEM — B37.0 THRUSH: Status: ACTIVE | Noted: 2023-12-04

## 2023-12-04 PROBLEM — R51.9 FACIAL PAIN: Status: ACTIVE | Noted: 2023-12-04

## 2023-12-04 PROBLEM — K76.9 CHRONIC NONALCOHOLIC LIVER DISEASE: Status: ACTIVE | Noted: 2023-12-04

## 2023-12-04 PROBLEM — L82.1 OTHER SEBORRHEIC KERATOSIS: Status: ACTIVE | Noted: 2019-12-11

## 2023-12-04 PROBLEM — R10.12 LEFT UPPER QUADRANT PAIN: Status: ACTIVE | Noted: 2023-12-04

## 2023-12-04 PROBLEM — M79.2 PERIPHERAL NEURALGIA: Status: ACTIVE | Noted: 2020-08-03

## 2023-12-04 PROBLEM — L70.9 ACNE: Status: ACTIVE | Noted: 2023-12-04

## 2023-12-04 PROBLEM — D18.01 HEMANGIOMA OF SKIN AND SUBCUTANEOUS TISSUE: Status: ACTIVE | Noted: 2019-12-11

## 2023-12-04 PROBLEM — R91.1 LUNG NODULE: Status: ACTIVE | Noted: 2023-12-04

## 2023-12-04 PROBLEM — R07.81 RIB PAIN: Status: ACTIVE | Noted: 2023-12-04

## 2023-12-04 PROBLEM — E78.2 MIXED HYPERLIPIDEMIA: Status: ACTIVE | Noted: 2023-12-04

## 2023-12-04 PROBLEM — R73.03 PREDIABETES: Status: ACTIVE | Noted: 2020-05-04

## 2023-12-04 PROBLEM — E55.9 VITAMIN D DEFICIENCY: Status: ACTIVE | Noted: 2023-12-04

## 2023-12-04 PROBLEM — G47.9 SLEEP DIFFICULTIES: Status: ACTIVE | Noted: 2023-12-04

## 2023-12-04 PROBLEM — L81.4 OTHER MELANIN HYPERPIGMENTATION: Status: ACTIVE | Noted: 2019-12-11

## 2023-12-04 PROBLEM — B42.9 SPOROTRICHOSIS: Status: ACTIVE | Noted: 2023-12-04

## 2023-12-04 PROBLEM — R73.09 ABNORMAL GLUCOSE: Status: ACTIVE | Noted: 2023-12-04

## 2023-12-04 PROBLEM — K58.9 IRRITABLE BOWEL SYNDROME: Status: ACTIVE | Noted: 2023-12-04

## 2023-12-04 PROBLEM — K22.9 DISORDER OF ESOPHAGUS: Status: ACTIVE | Noted: 2020-05-15

## 2023-12-04 PROBLEM — R19.4 CHANGE IN BOWEL HABIT: Status: ACTIVE | Noted: 2023-12-04

## 2023-12-04 PROBLEM — G43.909 MIGRAINE WITHOUT STATUS MIGRAINOSUS, NOT INTRACTABLE: Status: ACTIVE | Noted: 2023-12-04

## 2023-12-04 PROBLEM — K21.9 GERD WITHOUT ESOPHAGITIS: Status: ACTIVE | Noted: 2018-12-12

## 2023-12-04 PROBLEM — F41.9 ANXIETY DISORDER: Status: ACTIVE | Noted: 2023-12-04

## 2023-12-04 PROBLEM — T63.441A BEE STING REACTION: Status: ACTIVE | Noted: 2023-12-04

## 2023-12-04 PROBLEM — E53.8 VITAMIN B 12 DEFICIENCY: Status: ACTIVE | Noted: 2023-12-04

## 2023-12-04 PROBLEM — D22.9 ATYPICAL MOLE: Status: ACTIVE | Noted: 2023-12-04

## 2023-12-04 PROBLEM — R53.83 FATIGUE: Status: ACTIVE | Noted: 2023-12-04

## 2023-12-04 RX ORDER — ALBUTEROL SULFATE 90 UG/1
2 AEROSOL, METERED RESPIRATORY (INHALATION) EVERY 6 HOURS PRN
Qty: 8.5 G | Refills: 5 | Status: SHIPPED | OUTPATIENT
Start: 2023-12-04 | End: 2024-12-03

## 2023-12-04 NOTE — TELEPHONE ENCOUNTER
Patient asking if you will send rx for Albuterol inhaler for her to use, she states her pulse ox has been running 88-95%, but has been doing stairs to take her dog out thinks that is what is causing it to go down.

## 2023-12-11 ENCOUNTER — APPOINTMENT (OUTPATIENT)
Dept: PRIMARY CARE | Facility: CLINIC | Age: 73
End: 2023-12-11
Payer: MEDICARE

## 2023-12-15 ENCOUNTER — OFFICE VISIT (OUTPATIENT)
Dept: PRIMARY CARE | Facility: CLINIC | Age: 73
End: 2023-12-15
Payer: MEDICARE

## 2023-12-15 VITALS
HEIGHT: 66 IN | DIASTOLIC BLOOD PRESSURE: 80 MMHG | WEIGHT: 171 LBS | HEART RATE: 95 BPM | SYSTOLIC BLOOD PRESSURE: 145 MMHG | OXYGEN SATURATION: 96 % | TEMPERATURE: 97.2 F | BODY MASS INDEX: 27.48 KG/M2

## 2023-12-15 DIAGNOSIS — J40 BRONCHITIS: Primary | ICD-10-CM

## 2023-12-15 DIAGNOSIS — R14.0 BLOATING: ICD-10-CM

## 2023-12-15 DIAGNOSIS — R03.0 ELEVATED BLOOD PRESSURE READING: ICD-10-CM

## 2023-12-15 DIAGNOSIS — R91.1 LUNG NODULE: ICD-10-CM

## 2023-12-15 PROCEDURE — 99214 OFFICE O/P EST MOD 30 MIN: CPT | Performed by: INTERNAL MEDICINE

## 2023-12-15 PROCEDURE — 1160F RVW MEDS BY RX/DR IN RCRD: CPT | Performed by: INTERNAL MEDICINE

## 2023-12-15 PROCEDURE — 1036F TOBACCO NON-USER: CPT | Performed by: INTERNAL MEDICINE

## 2023-12-15 PROCEDURE — 1159F MED LIST DOCD IN RCRD: CPT | Performed by: INTERNAL MEDICINE

## 2023-12-15 RX ORDER — METHYLPREDNISOLONE 4 MG/1
TABLET ORAL
Qty: 21 TABLET | Refills: 0 | Status: SHIPPED | OUTPATIENT
Start: 2023-12-15 | End: 2023-12-22

## 2023-12-15 RX ORDER — AZITHROMYCIN 250 MG/1
TABLET, FILM COATED ORAL
Qty: 6 TABLET | Refills: 0 | Status: SHIPPED | OUTPATIENT
Start: 2023-12-15 | End: 2023-12-20

## 2023-12-15 ASSESSMENT — PATIENT HEALTH QUESTIONNAIRE - PHQ9
1. LITTLE INTEREST OR PLEASURE IN DOING THINGS: NOT AT ALL
SUM OF ALL RESPONSES TO PHQ9 QUESTIONS 1 AND 2: 0
2. FEELING DOWN, DEPRESSED OR HOPELESS: NOT AT ALL

## 2023-12-15 NOTE — PROGRESS NOTES
"Subjective   Patient ID: Terri Fernandez \"Brodie" is a 73 y.o. female who presents for Follow-up (Covid last week still with yellow phlegm/Still not feeling well, no more fevers).  HPI  Recovered from covid  But still cough  And yellow sputum  Used alb prn  Made tumeric paste  Miller tea  Fatigue  Vrbo in [place] station  Bp at vrbo  No fever  Steroid and botox in r side of face  Cannot remember doc  Bone on bone on left   Had not been taking care of herself due to taking care of friend w cancer     Review of Systems  Gen:  no fever  HEENT:  no trouble swallowing  CV:  no dyspnea, cyanosis  Lungs:  no shortness of breath  GI:  no constipation, no blood in stool  Vascular:  no edema  Neuro:   no weakness  Skin:  no rash  MS:no joint swelling  Gu:  no urinary complaints  All other systems have been reviewed and are negative for complaint    /80   Pulse 109   Temp 36.2 °C (97.2 °F) (Temporal)   Ht 1.676 m (5' 6\")   Wt 77.6 kg (171 lb)   SpO2 96%   BMI 27.60 kg/m²   Objective   Physical Exam  Lab Results   Component Value Date    WBC 8.0 11/15/2023    HGB 14.7 11/15/2023    HCT 45.3 11/15/2023    MCV 93 11/15/2023     11/15/2023     Lab Results   Component Value Date    GLUCOSE 104 (H) 11/15/2023    CALCIUM 9.2 11/15/2023     11/15/2023    K 4.2 11/15/2023    CO2 23 11/15/2023     11/15/2023    BUN 11 11/15/2023    CREATININE 0.93 11/15/2023     Social History     Socioeconomic History    Marital status:      Spouse name: None    Number of children: None    Years of education: None    Highest education level: None   Occupational History    None   Tobacco Use    Smoking status: Former     Types: Cigarettes    Smokeless tobacco: Never   Substance and Sexual Activity    Alcohol use: Yes     Comment: social use    Drug use: Never    Sexual activity: None   Other Topics Concern    None   Social History Narrative    None     Social Determinants of Health     Financial Resource Strain: " Not on file   Food Insecurity: Not on file   Transportation Needs: Not on file   Physical Activity: Not on file   Stress: Not on file   Social Connections: Not on file   Intimate Partner Violence: Not on file   Housing Stability: Not on file     Family History   Problem Relation Name Age of Onset    Multiple sclerosis Mother  99    Heart failure Mother      Leukemia Father  78    No Known Problems Sister      No Known Problems Brother      Other (had 4 siblings) Other         General:  Alert and in  NAD  Heent:  tms nl, throat clear.     Lungs, CTAB  Skin:  no suspicious lesions,  warm and dry  Head :  Normocephalic  Neck/thyroid:  neck supple, full rom, no cervical lymphadenopathy  no thyromegaly  Heart:  RRR  no murmurs  Abdomen:  Normal , bs present, soft, nontender, not distended, no masses palpated  Extremities:  No clubbing, cyanosis, or edema  Neurologic:  Nonfocal  Psych: alert, normal mood    Problem List Items Addressed This Visit             ICD-10-CM    Lung nodule R91.1    Relevant Orders    CT chest wo IV contrast     Other Visit Diagnoses         Codes    Bronchitis    -  Primary J40    Relevant Medications    azithromycin (Zithromax) 250 mg tablet    methylPREDNISolone (Medrol Dospak) 4 mg tablets    Other Relevant Orders    CT chest wo IV contrast    Bloating     R14.0    Relevant Orders    US abdomen complete    Elevated blood pressure reading     R03.0    to monitor at home, call w numbers may start b blocker            Follow up in one month or prn

## 2023-12-22 ENCOUNTER — APPOINTMENT (OUTPATIENT)
Dept: DERMATOLOGY | Facility: CLINIC | Age: 73
End: 2023-12-22
Payer: MEDICARE

## 2023-12-23 ENCOUNTER — APPOINTMENT (OUTPATIENT)
Dept: CARDIOLOGY | Facility: HOSPITAL | Age: 73
End: 2023-12-23
Payer: MEDICARE

## 2023-12-23 ENCOUNTER — APPOINTMENT (OUTPATIENT)
Dept: RADIOLOGY | Facility: HOSPITAL | Age: 73
End: 2023-12-23
Payer: MEDICARE

## 2023-12-23 ENCOUNTER — HOSPITAL ENCOUNTER (EMERGENCY)
Facility: HOSPITAL | Age: 73
Discharge: HOME | End: 2023-12-23
Attending: STUDENT IN AN ORGANIZED HEALTH CARE EDUCATION/TRAINING PROGRAM
Payer: MEDICARE

## 2023-12-23 VITALS
WEIGHT: 170 LBS | RESPIRATION RATE: 20 BRPM | TEMPERATURE: 98.1 F | BODY MASS INDEX: 27.44 KG/M2 | OXYGEN SATURATION: 96 % | DIASTOLIC BLOOD PRESSURE: 85 MMHG | HEART RATE: 87 BPM | SYSTOLIC BLOOD PRESSURE: 140 MMHG

## 2023-12-23 DIAGNOSIS — R00.0 TACHYCARDIA: Primary | ICD-10-CM

## 2023-12-23 DIAGNOSIS — R06.02 SOB (SHORTNESS OF BREATH): ICD-10-CM

## 2023-12-23 DIAGNOSIS — J18.9 PNEUMONIA DUE TO INFECTIOUS ORGANISM, UNSPECIFIED LATERALITY, UNSPECIFIED PART OF LUNG: ICD-10-CM

## 2023-12-23 LAB
ALBUMIN SERPL BCP-MCNC: 4.1 G/DL (ref 3.4–5)
ALP SERPL-CCNC: 87 U/L (ref 33–136)
ALT SERPL W P-5'-P-CCNC: 19 U/L (ref 7–45)
ANION GAP SERPL CALC-SCNC: 17 MMOL/L (ref 10–20)
AST SERPL W P-5'-P-CCNC: 22 U/L (ref 9–39)
BASOPHILS # BLD AUTO: 0.04 X10*3/UL (ref 0–0.1)
BASOPHILS NFR BLD AUTO: 0.3 %
BILIRUB SERPL-MCNC: 0.6 MG/DL (ref 0–1.2)
BUN SERPL-MCNC: 18 MG/DL (ref 6–23)
CALCIUM SERPL-MCNC: 9.3 MG/DL (ref 8.6–10.3)
CARDIAC TROPONIN I PNL SERPL HS: 5 NG/L (ref 0–13)
CHLORIDE SERPL-SCNC: 104 MMOL/L (ref 98–107)
CO2 SERPL-SCNC: 20 MMOL/L (ref 21–32)
CREAT SERPL-MCNC: 1.25 MG/DL (ref 0.5–1.05)
D DIMER PPP FEU-MCNC: 258 NG/ML FEU
EOSINOPHIL # BLD AUTO: 0.02 X10*3/UL (ref 0–0.4)
EOSINOPHIL NFR BLD AUTO: 0.2 %
ERYTHROCYTE [DISTWIDTH] IN BLOOD BY AUTOMATED COUNT: 12.5 % (ref 11.5–14.5)
GFR SERPL CREATININE-BSD FRML MDRD: 46 ML/MIN/1.73M*2
GLUCOSE SERPL-MCNC: 107 MG/DL (ref 74–99)
HCT VFR BLD AUTO: 46.1 % (ref 36–46)
HGB BLD-MCNC: 15.2 G/DL (ref 12–16)
IMM GRANULOCYTES # BLD AUTO: 0.05 X10*3/UL (ref 0–0.5)
IMM GRANULOCYTES NFR BLD AUTO: 0.4 % (ref 0–0.9)
INR PPP: 1 (ref 0.9–1.1)
LYMPHOCYTES # BLD AUTO: 3.32 X10*3/UL (ref 0.8–3)
LYMPHOCYTES NFR BLD AUTO: 26.1 %
MCH RBC QN AUTO: 29.7 PG (ref 26–34)
MCHC RBC AUTO-ENTMCNC: 33 G/DL (ref 32–36)
MCV RBC AUTO: 90 FL (ref 80–100)
MONOCYTES # BLD AUTO: 0.95 X10*3/UL (ref 0.05–0.8)
MONOCYTES NFR BLD AUTO: 7.5 %
NEUTROPHILS # BLD AUTO: 8.32 X10*3/UL (ref 1.6–5.5)
NEUTROPHILS NFR BLD AUTO: 65.5 %
NRBC BLD-RTO: 0 /100 WBCS (ref 0–0)
PLATELET # BLD AUTO: 386 X10*3/UL (ref 150–450)
POTASSIUM SERPL-SCNC: 3.9 MMOL/L (ref 3.5–5.3)
PROT SERPL-MCNC: 7 G/DL (ref 6.4–8.2)
PROTHROMBIN TIME: 11.2 SECONDS (ref 9.8–12.8)
RBC # BLD AUTO: 5.11 X10*6/UL (ref 4–5.2)
SODIUM SERPL-SCNC: 137 MMOL/L (ref 136–145)
WBC # BLD AUTO: 12.7 X10*3/UL (ref 4.4–11.3)

## 2023-12-23 PROCEDURE — 2500000004 HC RX 250 GENERAL PHARMACY W/ HCPCS (ALT 636 FOR OP/ED)

## 2023-12-23 PROCEDURE — 99285 EMERGENCY DEPT VISIT HI MDM: CPT | Mod: 25

## 2023-12-23 PROCEDURE — 84484 ASSAY OF TROPONIN QUANT: CPT | Performed by: STUDENT IN AN ORGANIZED HEALTH CARE EDUCATION/TRAINING PROGRAM

## 2023-12-23 PROCEDURE — 99284 EMERGENCY DEPT VISIT MOD MDM: CPT | Performed by: STUDENT IN AN ORGANIZED HEALTH CARE EDUCATION/TRAINING PROGRAM

## 2023-12-23 PROCEDURE — 36415 COLL VENOUS BLD VENIPUNCTURE: CPT | Performed by: STUDENT IN AN ORGANIZED HEALTH CARE EDUCATION/TRAINING PROGRAM

## 2023-12-23 PROCEDURE — 85379 FIBRIN DEGRADATION QUANT: CPT

## 2023-12-23 PROCEDURE — 93005 ELECTROCARDIOGRAM TRACING: CPT

## 2023-12-23 PROCEDURE — 71250 CT THORAX DX C-: CPT

## 2023-12-23 PROCEDURE — 85610 PROTHROMBIN TIME: CPT | Performed by: STUDENT IN AN ORGANIZED HEALTH CARE EDUCATION/TRAINING PROGRAM

## 2023-12-23 PROCEDURE — 2500000001 HC RX 250 WO HCPCS SELF ADMINISTERED DRUGS (ALT 637 FOR MEDICARE OP)

## 2023-12-23 PROCEDURE — 80053 COMPREHEN METABOLIC PANEL: CPT | Performed by: STUDENT IN AN ORGANIZED HEALTH CARE EDUCATION/TRAINING PROGRAM

## 2023-12-23 PROCEDURE — 85025 COMPLETE CBC W/AUTO DIFF WBC: CPT | Performed by: STUDENT IN AN ORGANIZED HEALTH CARE EDUCATION/TRAINING PROGRAM

## 2023-12-23 PROCEDURE — 71250 CT THORAX DX C-: CPT | Performed by: STUDENT IN AN ORGANIZED HEALTH CARE EDUCATION/TRAINING PROGRAM

## 2023-12-23 PROCEDURE — 71045 X-RAY EXAM CHEST 1 VIEW: CPT | Performed by: STUDENT IN AN ORGANIZED HEALTH CARE EDUCATION/TRAINING PROGRAM

## 2023-12-23 PROCEDURE — 71045 X-RAY EXAM CHEST 1 VIEW: CPT

## 2023-12-23 RX ORDER — DOXYCYCLINE HYCLATE 100 MG
100 TABLET ORAL EVERY 12 HOURS
Qty: 20 TABLET | Refills: 0 | Status: SHIPPED | OUTPATIENT
Start: 2023-12-23 | End: 2024-01-02

## 2023-12-23 RX ORDER — AMOXICILLIN AND CLAVULANATE POTASSIUM 875; 125 MG/1; MG/1
1 TABLET, FILM COATED ORAL EVERY 12 HOURS
Qty: 20 TABLET | Refills: 0 | Status: SHIPPED | OUTPATIENT
Start: 2023-12-23 | End: 2024-01-02

## 2023-12-23 RX ORDER — DOXYCYCLINE 100 MG/1
100 CAPSULE ORAL ONCE
Status: COMPLETED | OUTPATIENT
Start: 2023-12-23 | End: 2023-12-23

## 2023-12-23 RX ORDER — AMOXICILLIN AND CLAVULANATE POTASSIUM 875; 125 MG/1; MG/1
1 TABLET, FILM COATED ORAL ONCE
Status: COMPLETED | OUTPATIENT
Start: 2023-12-23 | End: 2023-12-23

## 2023-12-23 RX ADMIN — SODIUM CHLORIDE 1000 ML: 9 INJECTION, SOLUTION INTRAVENOUS at 20:36

## 2023-12-23 RX ADMIN — DOXYCYCLINE HYCLATE 100 MG: 100 CAPSULE ORAL at 23:17

## 2023-12-23 RX ADMIN — AMOXICILLIN AND CLAVULANATE POTASSIUM 875 MG: 875; 125 TABLET, FILM COATED ORAL at 23:17

## 2023-12-23 ASSESSMENT — COLUMBIA-SUICIDE SEVERITY RATING SCALE - C-SSRS
6. HAVE YOU EVER DONE ANYTHING, STARTED TO DO ANYTHING, OR PREPARED TO DO ANYTHING TO END YOUR LIFE?: NO
2. HAVE YOU ACTUALLY HAD ANY THOUGHTS OF KILLING YOURSELF?: NO
1. IN THE PAST MONTH, HAVE YOU WISHED YOU WERE DEAD OR WISHED YOU COULD GO TO SLEEP AND NOT WAKE UP?: NO

## 2023-12-23 ASSESSMENT — PAIN - FUNCTIONAL ASSESSMENT: PAIN_FUNCTIONAL_ASSESSMENT: 0-10

## 2023-12-23 ASSESSMENT — PAIN SCALES - GENERAL: PAINLEVEL_OUTOF10: 0 - NO PAIN

## 2023-12-23 ASSESSMENT — LIFESTYLE VARIABLES
EVER FELT BAD OR GUILTY ABOUT YOUR DRINKING: NO
REASON UNABLE TO ASSESS: NO
HAVE PEOPLE ANNOYED YOU BY CRITICIZING YOUR DRINKING: NO
EVER HAD A DRINK FIRST THING IN THE MORNING TO STEADY YOUR NERVES TO GET RID OF A HANGOVER: NO
HAVE YOU EVER FELT YOU SHOULD CUT DOWN ON YOUR DRINKING: NO

## 2023-12-24 NOTE — ED PROVIDER NOTES
EMERGENCY DEPARTMENT ENCOUNTER      Pt Name: Terri Fernandez  MRN: [id number]  Birthdate 1950  Date of evaluation: 12/23/2023    HISTORY OF PRESENT ILLNESS    Terri Fernandez is an 73 y.o. female with history including nonalcoholic fatty liver disease, GERD, hyperlipidemia, presenting to the emergency department for shortness of breath and hypoxia.  Patient states that she was diagnosed with COVID 2 weeks ago.  She was started on Paxlovid and completed the treatment.  Patient states that she has had shortness of breath ever since she has gotten COVID.  She initially was worse but has improved.  Patient states that she has been having low oxygen saturations at home in the 80s and continues to have worsening shortness of breath.  Patient states that she gets dyspnea on exertion when trying to climb stairs.  She is concerned that she may have pneumonia secondary to COVID.  Patient does have a productive cough of yellow sputum.  No recent fevers or chills.  She denies any nausea, vomiting diarrhea or constipation.  Patient has had no chest pain with her shortness of breath.      PAST MEDICAL HISTORY     Past Medical History:   Diagnosis Date    Abdominal swelling     Encounter for other screening for malignant neoplasm of breast 09/16/2019    Breast cancer screening    Endometriosis     Intra-abdominal and pelvic swelling, mass and lump, unspecified site 05/14/2015    Abdominal swelling    Other specified diseases of intestine 01/22/2015    Intestinal bacterial overgrowth    Personal history of other diseases of the female genital tract     History of endometriosis    Personal history of other infectious and parasitic diseases 01/12/2015    History of Helicobacter pylori infection    Personal history of other medical treatment     H/O chest x-ray    Personal history of other specified conditions 08/27/2018    History of abdominal pain       SURGICAL HISTORY       Past Surgical History:   Procedure Laterality Date     ABDOMINAL ADHESION SURGERY  02/09/2015    Laparoscopic Lysis Of Intestinal Adhesions    APPENDECTOMY  01/12/2015    Appendectomy    OTHER SURGICAL HISTORY  01/12/2015    Ovarian Cystectomy    OTHER SURGICAL HISTORY  01/12/2015    Hemilaminectomy Decompressive With Partial Facetectomy    OTHER SURGICAL HISTORY  09/16/2019    Colonoscopy dr irvin    SINUS SURGERY      screws still in jaw removed from sinuses    TONSILLECTOMY  01/12/2015    Tonsillectomy       CURRENT MEDICATIONS       Discharge Medication List as of 12/23/2023 11:04 PM        CONTINUE these medications which have NOT CHANGED    Details   albuterol (ProAir HFA) 90 mcg/actuation inhaler Inhale 2 puffs every 6 hours if needed for wheezing or shortness of breath., Starting Mon 12/4/2023, Until Tue 12/3/2024 at 2359, Normal      amitriptyline (Elavil) 25 mg tablet Take 1 tablet (25 mg) by mouth once daily at bedtime., Starting Fri 11/10/2023, Normal      ascorbic acid (Vitamin C) 250 MG chewable tablet Chew 1 tablet (250 mg)., Historical Med      Bacillus coagulans (PROBIOTIC, B. COAGULANS, ORAL) Take 1 tablet by mouth once daily., Historical Med      chlordiazePOXIDE (Librium) 10 mg capsule Take 1 capsule (10 mg) by mouth 3 times a day as needed for anxiety., Starting Fri 11/10/2023, Normal      co-enzyme Q-10 30 mg capsule Take 1 capsule (30 mg) by mouth once daily., Historical Med      cyanocobalamin (Vitamin B-12) 1,000 mcg tablet Take 100 mcg by mouth once daily., Historical Med      estradiol (Estrace) 1 mg tablet Take 1 tablet (1 mg) by mouth once daily., Historical Med      mirabegron (Mybetriq) 25 mg tablet extended release 24 hr 24 hr tablet Take 1 tablet (25 mg) by mouth once daily., Starting Wed 11/15/2023, Normal      omeprazole OTC (PriLOSEC OTC) 20 mg EC tablet Take 1 tablet (20 mg) by mouth once daily in the morning. Take before meals. Do not crush, chew, or split., Historical Med      progesterone (Prometrium) 100 mg capsule Take 1  capsule (100 mg) by mouth once daily., Historical Med      rizatriptan (Maxalt) 10 mg tablet Take 1 tablet (10 mg) by mouth 1 time if needed for migraine. May repeat in 2 hours if unresolved. Do not exceed 30 mg in 24 hours., Historical Med      tretinoin (Retin-A) 0.1 % cream Apply topically once daily at bedtime., Historical Med      turmeric, bulk, 95 % powder Historical Med             ALLERGIES     Beta-blockers (beta-adrenergic blocking agts), Ciprofloxacin, Codeine sulfate, and Iodinated contrast media    FAMILY HISTORY       Family History   Problem Relation Name Age of Onset    Multiple sclerosis Mother  99    Heart failure Mother      Leukemia Father  78    No Known Problems Sister      No Known Problems Brother      Other (had 4 siblings) Other          SOCIAL HISTORY       Social History     Socioeconomic History    Marital status:      Spouse name: None    Number of children: None    Years of education: None    Highest education level: None   Occupational History    None   Tobacco Use    Smoking status: Former     Types: Cigarettes    Smokeless tobacco: Never   Substance and Sexual Activity    Alcohol use: Yes     Comment: social use    Drug use: Never    Sexual activity: None   Other Topics Concern    None   Social History Narrative    None     Social Determinants of Health     Financial Resource Strain: Not on file   Food Insecurity: Not on file   Transportation Needs: Not on file   Physical Activity: Not on file   Stress: Not on file   Social Connections: Not on file   Intimate Partner Violence: Not on file   Housing Stability: Not on file       PHYSICAL EXAM       ED Triage Vitals [12/23/23 1858]   Temp Heart Rate Resp BP   36.7 °C (98.1 °F) (!) 132 18 160/90      SpO2 Temp Source Heart Rate Source Patient Position   97 % Temporal Monitor Sitting      BP Location FiO2 (%)     Right arm --       Physical Exam  Vitals and nursing note reviewed.   Constitutional:       General: She is not in  acute distress.     Appearance: She is well-developed.   HENT:      Head: Normocephalic and atraumatic.   Eyes:      Conjunctiva/sclera: Conjunctivae normal.   Cardiovascular:      Rate and Rhythm: Tachycardia present. Rhythm irregular.      Heart sounds: No murmur heard.  Pulmonary:      Effort: Pulmonary effort is normal. No respiratory distress.      Breath sounds: Normal breath sounds.   Abdominal:      Palpations: Abdomen is soft.      Tenderness: There is no abdominal tenderness.   Musculoskeletal:         General: No swelling.      Cervical back: Neck supple.   Skin:     General: Skin is warm and dry.      Capillary Refill: Capillary refill takes less than 2 seconds.   Neurological:      Mental Status: She is alert.   Psychiatric:         Mood and Affect: Mood normal.          DIAGNOSTIC RESULTS     LABS:  Labs Reviewed   CBC WITH AUTO DIFFERENTIAL - Abnormal       Result Value    WBC 12.7 (*)     nRBC 0.0      RBC 5.11      Hemoglobin 15.2      Hematocrit 46.1 (*)     MCV 90      MCH 29.7      MCHC 33.0      RDW 12.5      Platelets 386      Neutrophils % 65.5      Immature Granulocytes %, Automated 0.4      Lymphocytes % 26.1      Monocytes % 7.5      Eosinophils % 0.2      Basophils % 0.3      Neutrophils Absolute 8.32 (*)     Immature Granulocytes Absolute, Automated 0.05      Lymphocytes Absolute 3.32 (*)     Monocytes Absolute 0.95 (*)     Eosinophils Absolute 0.02      Basophils Absolute 0.04     COMPREHENSIVE METABOLIC PANEL - Abnormal    Glucose 107 (*)     Sodium 137      Potassium 3.9      Chloride 104      Bicarbonate 20 (*)     Anion Gap 17      Urea Nitrogen 18      Creatinine 1.25 (*)     eGFR 46 (*)     Calcium 9.3      Albumin 4.1      Alkaline Phosphatase 87      Total Protein 7.0      AST 22      Bilirubin, Total 0.6      ALT 19     TROPONIN I, HIGH SENSITIVITY - Normal    Troponin I, High Sensitivity 5      Narrative:     Less than 99th percentile of normal range cutoff-  Female and  children under 18 years old <14 ng/L; Male <21 ng/L: Negative  Repeat testing should be performed if clinically indicated.     Female and children under 18 years old 14-50 ng/L; Male 21-50 ng/L:  Consistent with possible cardiac damage and possible increased clinical   risk. Serial measurements may help to assess extent of myocardial damage.     >50 ng/L: Consistent with cardiac damage, increased clinical risk and  myocardial infarction. Serial measurements may help assess extent of   myocardial damage.      NOTE: Children less than 1 year old may have higher baseline troponin   levels and results should be interpreted in conjunction with the overall   clinical context.     NOTE: Troponin I testing is performed using a different   testing methodology at [facility] than at other   [facility]. Direct result comparisons should only   be made within the same method.   PROTIME-INR - Normal    Protime 11.2      INR 1.0     D-DIMER, VTE EXCLUSION - Normal    D-Dimer, Quantitative VTE Exclusion 258      Narrative:     The VTE Exclusion D-Dimer assay is reported in ng/mL Fibrinogen Equivalent Units (FEU).    Per 's instructions for use, a value of less than 500 ng/mL (FEU) may help to exclude DVT or PE in outpatients when the assay is used with a clinical pretest probability assessment.([facility] must utilize and document eCalc 'Wells Score Deep Vein Thrombosis Risk' for DVT exclusion only. Emergency Department should utilize  Guidelines for Emergency Department Use of the VTE Exclusion D-Dimer and Clinical Pretest probability assessment model for DVT or PE exclusion.)       All other labs were within normal range or not returned as of this dictation.    Imaging  CT chest wo IV contrast   Final Result   Numerous new ill-defined ground-glass subcentimeter nodules in all   lobes of both lungs. These are most likely infectious/inflammatory in   nature. However, recommend a three-month follow-up  chest CT to ensure   that all nodules resolve given elevated risk factors for lung cancer   (emphysema). These are atypical for both COVID-19 pneumonia and   classic bacterial pneumonia. Consider workup for atypical organisms.        Small hiatal hernia.             MACRO:   None.        Signed by: Russ Newton 12/23/2023 10:27 PM   Dictation workstation:   [id number]      XR chest 1 view   Final Result   No acute cardiopulmonary process.             MACRO:   None.        Signed by: Russ Newton 12/23/2023 8:36 PM   Dictation workstation:   [id number]           Procedures  Procedures     EMERGENCY DEPARTMENT COURSE/MDM:   Medical Decision Making  Terri Fernandez is an 73 y.o. female with history including nonalcoholic fatty liver disease, GERD, hyperlipidemia, presenting to the emergency department for shortness of breath and hypoxia.  Patient is tachycardic on arrival with recent COVID diagnosis dimer ordered to rule out blood clot.  Cardiac workup ordered.    Lab results reviewed.  Patient's dimer is negative ruling out blood clot.  Patient's initial troponin negative.  EKG shows sinus tachycardia with PACs.    Patient's white count is slightly elevated 12.7 CMP is pending.  Patient was given a liter of fluids for her tachycardia with improvement of her heart rate.    Chest x-ray reviewed shows no acute cardiopulmonary process.  CT without contrast was ordered to rule out pneumonia that could be missed on x-ray.  Pending final read on CT but I reviewed imaging no acute findings.    Care will be transitioned to night resident pending ambulatory pulse ox, CT, CMP.        Diagnoses as of 12/24/23 1659   Tachycardia   SOB (shortness of breath)   Pneumonia due to infectious organism, unspecified laterality, unspecified part of lung        External records reviewed: recent inpatient, clinic, and prior ED notes  Labs and Diagnostic imaging independently reviewed/interpreted by me.    Patient plan, care, lab  results and imaging were all discussed with attending.    ED Medications administered this visit:    Medications   sodium chloride 0.9 % bolus 1,000 mL (0 mL intravenous Stopped 12/23/23 2106)   amoxicillin-pot clavulanate (Augmentin) 875-125 mg per tablet 875 mg (875 mg oral Given 12/23/23 2317)   doxycycline (Vibramycin) capsule 100 mg (100 mg oral Given 12/23/23 2317)     New Prescriptions from this visit:    Discharge Medication List as of 12/23/2023 11:04 PM        START taking these medications    Details   amoxicillin-pot clavulanate (Augmentin) 875-125 mg tablet Take 1 tablet (875 mg) by mouth every 12 hours for 10 days., Starting Sat 12/23/2023, Until Tue 1/2/2024, Normal      doxycycline (Vibra-Tabs) 100 mg tablet Take 1 tablet (100 mg) by mouth every 12 hours for 10 days. Take with a full glass of water and do not lie down for at least 30 minutes after., Starting Sat 12/23/2023, Until Tue 1/2/2024, Normal             (Please note that portions of this note were completed with a voice recognition program.  Efforts were made to edit the dictations but occasionally words are mis-transcribed.)     Bev Cui,   Resident  12/24/23 6835

## 2023-12-24 NOTE — DISCHARGE INSTRUCTIONS
Follow-up with your primary care provider.  We have given you a prescription for doxycycline and Augmentin to be taken for the next 10 days to treat your pneumonia.  You will need a repeat CT scan in 2 to 3 months to follow-up on your findings today. Recommend taking it easy for the next few days.  If you start to develop chest pain or worsening shortness of breath please return to the ED for reevaluation.

## 2023-12-24 NOTE — ED PROVIDER NOTES
Emergency Medicine Transition of Care Note.    I received Terri Fernandez in signout from Dr. Cui.  Please see the previous ED provider note for all HPI, PE and MDM up to the time of signout at 2200. This is in addition to the primary record.    In brief Terri Fernandez is an 73 y.o. female presenting for   Chief Complaint   Patient presents with    Rapid Heart Rate     Pt states rapid HR started two days ago. Patient states normal rate 88-90. Patient dneies changes in vision or speech. Patient states rapid HR started after discontinuing steroids. Patient states HR ranges 112 to 134     At the time of signout we were awaiting: CT results and results of CMP     Diagnoses as of 12/23/23 2300   Tachycardia   SOB (shortness of breath)   Pneumonia due to infectious organism, unspecified laterality, unspecified part of lung       Medical Decision Making  CMP is overall unremarkable.CT chestWithNumerous new ill-defined groundglass subcentimeter nodules in all lobes of both lungsMost likely infectious inflammatory.  Patient was advised that she will need a follow-up CT scanIn the next 2 to 3 monthsFor reevaluation.  She was prescribed doxycycline and Augmentin for 10 days to treat pneumonia at home.  Otherwise does not need inpatient management at this time.Patient be discharged homeWith instructions to follow-up with her primary care provider as outpatient.    Insert my signaturePlease see ED course for the rest of the MDM.    Naveed Rice DO, PGY-2  Emergency Medicine    Plan of care discussed with the attending physician.        Final diagnoses:   [R00.0] Tachycardia   [R06.02] SOB (shortness of breath)           Procedure  Procedures    DO Naveed Yap DO  Resident  12/23/23 2302

## 2023-12-28 LAB
ATRIAL RATE: 109 BPM
P AXIS: 72 DEGREES
P OFFSET: 202 MS
P ONSET: 152 MS
PR INTERVAL: 136 MS
Q ONSET: 220 MS
QRS COUNT: 17 BEATS
QRS DURATION: 66 MS
QT INTERVAL: 326 MS
QTC CALCULATION(BAZETT): 439 MS
QTC FREDERICIA: 397 MS
R AXIS: 56 DEGREES
T AXIS: 63 DEGREES
T OFFSET: 383 MS
VENTRICULAR RATE: 109 BPM

## 2023-12-29 ENCOUNTER — TELEPHONE (OUTPATIENT)
Dept: PRIMARY CARE | Facility: CLINIC | Age: 73
End: 2023-12-29
Payer: MEDICARE

## 2023-12-29 NOTE — TELEPHONE ENCOUNTER
Sister noticing she is off, not sure if she has a uti or something else she is worried about went to ER last week and diagnosed with pneumonia.  I recommended she go to ER for evaluation today with concerns she mentioned

## 2024-01-04 ENCOUNTER — ANCILLARY PROCEDURE (OUTPATIENT)
Dept: RADIOLOGY | Facility: CLINIC | Age: 74
End: 2024-01-04
Payer: MEDICARE

## 2024-01-04 ENCOUNTER — TELEPHONE (OUTPATIENT)
Dept: PRIMARY CARE | Facility: CLINIC | Age: 74
End: 2024-01-04

## 2024-01-04 DIAGNOSIS — R14.0 BLOATING: ICD-10-CM

## 2024-01-04 PROCEDURE — 76700 US EXAM ABDOM COMPLETE: CPT | Performed by: RADIOLOGY

## 2024-01-04 PROCEDURE — 76700 US EXAM ABDOM COMPLETE: CPT

## 2024-01-04 NOTE — TELEPHONE ENCOUNTER
Patient had an appt with [facility] for US of the Abdomen today and also told the radiologist a CT of the abdomen and Pelvis without contrast was to be done.  There is not an order for the CT of the abdomen and pelvis.    I told the radiologist that there is not an order for that and you weren't in the office to discuss the need for this.  Patient states you and her talked about it and it was ordered.  Please advise to this order.

## 2024-01-05 DIAGNOSIS — R10.9 ABDOMINAL PAIN, UNSPECIFIED ABDOMINAL LOCATION: Primary | ICD-10-CM

## 2024-01-05 NOTE — TELEPHONE ENCOUNTER
Please call patient and help her get scheduled with CT if needed.  Order placed today.  Thank you.

## 2024-01-10 ENCOUNTER — OFFICE VISIT (OUTPATIENT)
Dept: PRIMARY CARE | Facility: CLINIC | Age: 74
End: 2024-01-10
Payer: MEDICARE

## 2024-01-10 VITALS
BODY MASS INDEX: 27.8 KG/M2 | DIASTOLIC BLOOD PRESSURE: 85 MMHG | WEIGHT: 173 LBS | TEMPERATURE: 96.8 F | HEART RATE: 98 BPM | SYSTOLIC BLOOD PRESSURE: 130 MMHG | HEIGHT: 66 IN | OXYGEN SATURATION: 96 %

## 2024-01-10 DIAGNOSIS — R14.0 ABDOMINAL DISTENTION: ICD-10-CM

## 2024-01-10 DIAGNOSIS — J18.9 PNEUMONIA DUE TO INFECTIOUS ORGANISM, UNSPECIFIED LATERALITY, UNSPECIFIED PART OF LUNG: Primary | ICD-10-CM

## 2024-01-10 PROCEDURE — 1159F MED LIST DOCD IN RCRD: CPT | Performed by: INTERNAL MEDICINE

## 2024-01-10 PROCEDURE — 1126F AMNT PAIN NOTED NONE PRSNT: CPT | Performed by: INTERNAL MEDICINE

## 2024-01-10 PROCEDURE — 1036F TOBACCO NON-USER: CPT | Performed by: INTERNAL MEDICINE

## 2024-01-10 PROCEDURE — 99214 OFFICE O/P EST MOD 30 MIN: CPT | Performed by: INTERNAL MEDICINE

## 2024-01-10 RX ORDER — SULFAMETHOXAZOLE AND TRIMETHOPRIM 800; 160 MG/1; MG/1
1 TABLET ORAL 2 TIMES DAILY
Qty: 14 TABLET | Refills: 0 | Status: SHIPPED | OUTPATIENT
Start: 2024-01-10 | End: 2024-01-17

## 2024-01-10 NOTE — PROGRESS NOTES
"Subjective   Patient ID: Terri Fernandez \"Brodie" is a 73 y.o. female who presents for primary care established (Coughing up yellow mucus /Lethargic /Absent minded ).  HPI  Went to er 12/23  Pulse ox 80's  [place] [facility]  Urgent care prior to that  Gave her fluids    Gave doxy  10 day supply  Yellow phlegm  Sleeping 12 hours per day  No fever  Never had covid vaccine  Now improved, but not back to self  No cp     Review of Systems  Gen:  no fever  HEENT:  no trouble swallowing  CV:  no dyspnea, cyanosis   GI:  no constipation, no blood in stool  Vascular:  no edema  Neuro:   no weakness  Skin:  no rash  MS:no joint swelling  Gu:  no urinary complaints  All other systems have been reviewed and are negative for complaint    /85   Pulse 98   Temp 36 °C (96.8 °F) (Temporal)   Ht 1.676 m (5' 6\")   Wt 78.5 kg (173 lb)   SpO2 96%   BMI 27.92 kg/m²   Objective   Physical Exam  Lab Results   Component Value Date    WBC 12.7 (H) 12/23/2023    HGB 15.2 12/23/2023    HCT 46.1 (H) 12/23/2023    MCV 90 12/23/2023     12/23/2023     Lab Results   Component Value Date    GLUCOSE 107 (H) 12/23/2023    CALCIUM 9.3 12/23/2023     12/23/2023    K 3.9 12/23/2023    CO2 20 (L) 12/23/2023     12/23/2023    BUN 18 12/23/2023    CREATININE 1.25 (H) 12/23/2023     Social History     Socioeconomic History    Marital status:      Spouse name: None    Number of children: None    Years of education: None    Highest education level: None   Occupational History    None   Tobacco Use    Smoking status: Former     Types: Cigarettes    Smokeless tobacco: Never   Substance and Sexual Activity    Alcohol use: Not Currently     Comment: social use    Drug use: Never    Sexual activity: None   Other Topics Concern    None   Social History Narrative    None     Social Determinants of Health     Financial Resource Strain: Not on file   Food Insecurity: Not on file   Transportation Needs: Not on file   Physical Activity: Not " on file   Stress: Not on file   Social Connections: Not on file   Intimate Partner Violence: Not on file   Housing Stability: Not on file     Family History   Problem Relation Name Age of Onset    Multiple sclerosis Mother  99    Heart failure Mother      Leukemia Father  78    No Known Problems Sister      No Known Problems Brother      Other (had 4 siblings) Other         General:  Alert and in  NAD  Heent:  tms nl, throat clear.     Lungs, CTAB  Skin:  no suspicious lesions,  warm and dry  Head :  Normocephalic  Neck/thyroid:  neck supple, full rom, no cervical lymphadenopathy  no thyromegaly  Heart:  RRR  no murmurs  Abdomen:  Normal , bs present, soft, nontender, not distended, no masses palpated  Extremities:  No clubbing, cyanosis, or edema  Neurologic:  Nonfocal  Psych: alert, normal mood      Terri was seen today for primary care established.  Diagnoses and all orders for this visit:  Pneumonia due to infectious organism, unspecified laterality, unspecified part of lung (Primary)  -     Comprehensive Metabolic Panel; Future  -     CBC and Auto Differential; Future  -     Sedimentation Rate; Future  -     Legionella Antigen, Urine; Future  -     sulfamethoxazole-trimethoprim (Bactrim DS) 800-160 mg tablet; Take 1 tablet by mouth 2 times a day for 7 days.  -     Sars-Cov-2 Nucleocapsid IgG Antibody; Future  -     SARS-CoV-2 Antibody Total; Future  -     CT abdomen pelvis w IV contrast; Future  -     Urinalysis with Reflex Microscopic; Future  -     Mycoplasma Pneumoniae Antibody, IgM; Future  Abdominal distention  -     CT abdomen pelvis w IV contrast; Future

## 2024-01-11 ENCOUNTER — LAB (OUTPATIENT)
Dept: LAB | Facility: LAB | Age: 74
End: 2024-01-11
Payer: MEDICARE

## 2024-01-11 ENCOUNTER — TELEPHONE (OUTPATIENT)
Dept: PRIMARY CARE | Facility: CLINIC | Age: 74
End: 2024-01-11

## 2024-01-11 DIAGNOSIS — J18.9 PNEUMONIA DUE TO INFECTIOUS ORGANISM, UNSPECIFIED LATERALITY, UNSPECIFIED PART OF LUNG: ICD-10-CM

## 2024-01-11 LAB
ALBUMIN SERPL BCP-MCNC: 3.9 G/DL (ref 3.4–5)
ALP SERPL-CCNC: 94 U/L (ref 33–136)
ALT SERPL W P-5'-P-CCNC: 19 U/L (ref 7–45)
ANION GAP SERPL CALC-SCNC: 10 MMOL/L (ref 10–20)
AST SERPL W P-5'-P-CCNC: 21 U/L (ref 9–39)
BASOPHILS # BLD AUTO: 0.05 X10*3/UL (ref 0–0.1)
BASOPHILS NFR BLD AUTO: 0.6 %
BILIRUB SERPL-MCNC: 0.5 MG/DL (ref 0–1.2)
BUN SERPL-MCNC: 10 MG/DL (ref 6–23)
CALCIUM SERPL-MCNC: 9.1 MG/DL (ref 8.6–10.3)
CHLORIDE SERPL-SCNC: 103 MMOL/L (ref 98–107)
CO2 SERPL-SCNC: 27 MMOL/L (ref 21–32)
CREAT SERPL-MCNC: 1.01 MG/DL (ref 0.5–1.05)
EGFRCR SERPLBLD CKD-EPI 2021: 60 ML/MIN/1.73M*2
EOSINOPHIL # BLD AUTO: 0.08 X10*3/UL (ref 0–0.4)
EOSINOPHIL NFR BLD AUTO: 1 %
ERYTHROCYTE [DISTWIDTH] IN BLOOD BY AUTOMATED COUNT: 13 % (ref 11.5–14.5)
ERYTHROCYTE [SEDIMENTATION RATE] IN BLOOD BY WESTERGREN METHOD: 13 MM/H (ref 0–30)
GLUCOSE SERPL-MCNC: 101 MG/DL (ref 74–99)
HCT VFR BLD AUTO: 43 % (ref 36–46)
HGB BLD-MCNC: 14.2 G/DL (ref 12–16)
IMM GRANULOCYTES # BLD AUTO: 0.01 X10*3/UL (ref 0–0.5)
IMM GRANULOCYTES NFR BLD AUTO: 0.1 % (ref 0–0.9)
LYMPHOCYTES # BLD AUTO: 2.44 X10*3/UL (ref 0.8–3)
LYMPHOCYTES NFR BLD AUTO: 31.5 %
MCH RBC QN AUTO: 30.8 PG (ref 26–34)
MCHC RBC AUTO-ENTMCNC: 33 G/DL (ref 32–36)
MCV RBC AUTO: 93 FL (ref 80–100)
MONOCYTES # BLD AUTO: 0.49 X10*3/UL (ref 0.05–0.8)
MONOCYTES NFR BLD AUTO: 6.3 %
NEUTROPHILS # BLD AUTO: 4.67 X10*3/UL (ref 1.6–5.5)
NEUTROPHILS NFR BLD AUTO: 60.5 %
NRBC BLD-RTO: 0 /100 WBCS (ref 0–0)
PLATELET # BLD AUTO: 335 X10*3/UL (ref 150–450)
POTASSIUM SERPL-SCNC: 4.2 MMOL/L (ref 3.5–5.3)
PROT SERPL-MCNC: 6.5 G/DL (ref 6.4–8.2)
RBC # BLD AUTO: 4.61 X10*6/UL (ref 4–5.2)
SODIUM SERPL-SCNC: 136 MMOL/L (ref 136–145)
WBC # BLD AUTO: 7.7 X10*3/UL (ref 4.4–11.3)

## 2024-01-11 PROCEDURE — 85652 RBC SED RATE AUTOMATED: CPT

## 2024-01-11 PROCEDURE — 85025 COMPLETE CBC W/AUTO DIFF WBC: CPT

## 2024-01-11 PROCEDURE — 86769 SARS-COV-2 COVID-19 ANTIBODY: CPT

## 2024-01-11 PROCEDURE — 86790 VIRUS ANTIBODY NOS: CPT

## 2024-01-11 PROCEDURE — 80053 COMPREHEN METABOLIC PANEL: CPT

## 2024-01-11 PROCEDURE — 36415 COLL VENOUS BLD VENIPUNCTURE: CPT

## 2024-01-11 PROCEDURE — 86738 MYCOPLASMA ANTIBODY: CPT

## 2024-01-11 NOTE — TELEPHONE ENCOUNTER
Patient came IO stating she needs her CT abdomen pelvis to be changed to STAT and wo contrast, also needs CT of Chest changed to STAT wo contrast due to her leaving on 1/26.   Please advise

## 2024-01-12 LAB — SARS-COV-2 IGG SERPLBLD QL IA.RAPID: POSITIVE

## 2024-01-13 LAB
SARS-COV-2 IGG SERPL IA-ACNC: <1 IV
SARS-COV-2 IGG SERPL QL IA: NEGATIVE

## 2024-01-14 LAB — M PNEUMO IGM SER IA-ACNC: 0.16 U/L

## 2024-01-23 ENCOUNTER — APPOINTMENT (OUTPATIENT)
Dept: PODIATRY | Facility: CLINIC | Age: 74
End: 2024-01-23
Payer: MEDICARE

## 2024-08-29 DIAGNOSIS — Z12.31 ENCOUNTER FOR SCREENING MAMMOGRAM FOR BREAST CANCER: ICD-10-CM

## 2024-10-07 ENCOUNTER — APPOINTMENT (OUTPATIENT)
Dept: PRIMARY CARE | Facility: CLINIC | Age: 74
End: 2024-10-07
Payer: MEDICARE